# Patient Record
Sex: FEMALE | URBAN - METROPOLITAN AREA
[De-identification: names, ages, dates, MRNs, and addresses within clinical notes are randomized per-mention and may not be internally consistent; named-entity substitution may affect disease eponyms.]

---

## 2024-04-04 ENCOUNTER — APPOINTMENT (RX ONLY)
Dept: URBAN - METROPOLITAN AREA CLINIC 176 | Facility: CLINIC | Age: 29
Setting detail: DERMATOLOGY
End: 2024-04-04

## 2024-04-04 DIAGNOSIS — Z41.9 ENCOUNTER FOR PROCEDURE FOR PURPOSES OTHER THAN REMEDYING HEALTH STATE, UNSPECIFIED: ICD-10-CM

## 2024-04-04 PROCEDURE — ? HYDRAFACIAL

## 2024-04-04 ASSESSMENT — LOCATION ZONE DERM: LOCATION ZONE: FACE

## 2024-04-04 ASSESSMENT — LOCATION SIMPLE DESCRIPTION DERM
LOCATION SIMPLE: CHIN
LOCATION SIMPLE: INFERIOR FOREHEAD
LOCATION SIMPLE: RIGHT CHEEK
LOCATION SIMPLE: LEFT CHEEK

## 2024-04-04 ASSESSMENT — LOCATION DETAILED DESCRIPTION DERM
LOCATION DETAILED: RIGHT INFERIOR CENTRAL MALAR CHEEK
LOCATION DETAILED: INFERIOR MID FOREHEAD
LOCATION DETAILED: LEFT CENTRAL MALAR CHEEK
LOCATION DETAILED: RIGHT CHIN

## 2024-04-04 NOTE — PROCEDURE: HYDRAFACIAL
Procedure: Peel
Vacuum Pressure High Setting (Will Not Render If Set To 0): 22
Consent: Written consent obtained, risks reviewed including but not limited to crusting, scabbing, blistering, scarring, darker or lighter pigmentary change, bruising, and/or incomplete response.
Solution: Activ-4
Vacuum Pressure High Setting (Will Not Render If Set To 0): 16
Location: face
Procedure: Extend and Protect
Solution Override
Post-Care Instructions: I reviewed with the patient in detail post-care instructions. Patient should stay away from the sun and wear sun protection until treated areas are fully healed.
Vacuum Pressure Low Setting (Will Not Render If Set To 0): 0
Solution: GlySal 7.5%
Procedure: Extraction
Tip: Hydropeel Tip, Teal
Tip: Hydropeel Tip, Clear
Treatment Number: 1
Procedure: Fusion
Vacuum Pressure High Setting (Will Not Render If Set To 0): 14
Tip: Hydropeel Tip, Blue
Price (Use Numbers Only, No Special Characters Or $): 250.00
Indication: anti-aging
Solution: Beta-HD
Procedure: Exfoliation
Tip Override
Procedure: Boost

## 2024-05-16 ENCOUNTER — APPOINTMENT (RX ONLY)
Dept: URBAN - METROPOLITAN AREA CLINIC 176 | Facility: CLINIC | Age: 29
Setting detail: DERMATOLOGY
End: 2024-05-16

## 2024-05-16 DIAGNOSIS — L70.0 ACNE VULGARIS: ICD-10-CM

## 2024-05-16 PROCEDURE — ? HYDRAFACIAL

## 2024-05-16 ASSESSMENT — LOCATION SIMPLE DESCRIPTION DERM
LOCATION SIMPLE: LEFT CHEEK
LOCATION SIMPLE: RIGHT CHEEK
LOCATION SIMPLE: CHIN
LOCATION SIMPLE: LEFT FOREHEAD

## 2024-05-16 ASSESSMENT — LOCATION DETAILED DESCRIPTION DERM
LOCATION DETAILED: LEFT CENTRAL MALAR CHEEK
LOCATION DETAILED: RIGHT CENTRAL MALAR CHEEK
LOCATION DETAILED: RIGHT CHIN
LOCATION DETAILED: LEFT INFERIOR MEDIAL FOREHEAD

## 2024-05-16 ASSESSMENT — LOCATION ZONE DERM: LOCATION ZONE: FACE

## 2024-05-16 NOTE — PROCEDURE: HYDRAFACIAL
Location: face
Vacuum Pressure Low Setting (Will Not Render If Set To 0): 0
Tip: Hydropeel Tip, Teal
Procedure: Peel
Solution: Activ-4
Consent: Written consent obtained, risks reviewed including but not limited to crusting, scabbing, blistering, scarring, darker or lighter pigmentary change, bruising, and/or incomplete response.
Tip Override
Solution Override
Post-Care Instructions: I reviewed with the patient in detail post-care instructions. Patient should stay away from the sun and wear sun protection until treated areas are fully healed.
Vacuum Pressure High Setting (Will Not Render If Set To 0): 16
Tip: Hydropeel Tip, Clear
Procedure: Extraction
Vacuum Pressure High Setting (Will Not Render If Set To 0): 14
Solution: GlySal 7.5%
Treatment Number: 1
Procedure: Boost
Price (Use Numbers Only, No Special Characters Or $): 185.00
Tip: Hydropeel Tip, Blue
Solution: Beta-HD
Procedure: Fusion
Indication: acne
Vacuum Pressure High Setting (Will Not Render If Set To 0): 22
Procedure: Exfoliation
Procedure: Extend and Protect

## 2024-06-27 ENCOUNTER — APPOINTMENT (RX ONLY)
Dept: URBAN - METROPOLITAN AREA CLINIC 176 | Facility: CLINIC | Age: 29
Setting detail: DERMATOLOGY
End: 2024-06-27

## 2024-06-27 DIAGNOSIS — Z41.9 ENCOUNTER FOR PROCEDURE FOR PURPOSES OTHER THAN REMEDYING HEALTH STATE, UNSPECIFIED: ICD-10-CM

## 2024-06-27 PROCEDURE — ? FACIAL

## 2024-06-27 ASSESSMENT — LOCATION SIMPLE DESCRIPTION DERM: LOCATION SIMPLE: RIGHT FOREHEAD

## 2024-06-27 ASSESSMENT — LOCATION DETAILED DESCRIPTION DERM: LOCATION DETAILED: RIGHT MEDIAL FOREHEAD

## 2024-06-27 ASSESSMENT — LOCATION ZONE DERM: LOCATION ZONE: FACE

## 2024-06-27 NOTE — PROCEDURE: FACIAL
Detail Level: Zone
Exfoliation Type: enzyme peel
Comments (Non-Sticky): Patient here for HF however she has heavy little congestion throughout the face and wants to really work on this.  Is frustrated that it is not clearing.  Reviewed home care products and will add Tret . 025% and Hylgel to see if this helps her skin.  May have another facial + extarctions next month as well.  \\n\\nFacial +extractions:\\nCleansed\\nEnzymatic peel 5 mins\\nExtractions\\nSulfur mask 10 mins\\nPad\\nClarifying serum\\nAcne control\\nElta Clear tinted SPF
Price (Use Numbers Only, No Special Characters Or $): 200

## 2024-11-20 ENCOUNTER — APPOINTMENT (RX ONLY)
Dept: URBAN - METROPOLITAN AREA CLINIC 176 | Facility: CLINIC | Age: 29
Setting detail: DERMATOLOGY
End: 2024-11-20

## 2024-11-20 DIAGNOSIS — Z41.9 ENCOUNTER FOR PROCEDURE FOR PURPOSES OTHER THAN REMEDYING HEALTH STATE, UNSPECIFIED: ICD-10-CM

## 2024-11-20 PROCEDURE — ? FACIAL

## 2024-11-20 ASSESSMENT — LOCATION SIMPLE DESCRIPTION DERM: LOCATION SIMPLE: LEFT FOREHEAD

## 2024-11-20 ASSESSMENT — LOCATION DETAILED DESCRIPTION DERM: LOCATION DETAILED: LEFT MEDIAL FOREHEAD

## 2024-11-20 ASSESSMENT — LOCATION ZONE DERM: LOCATION ZONE: FACE

## 2024-11-20 NOTE — PROCEDURE: FACIAL
Price (Use Numbers Only, No Special Characters Or $): 200
Detail Level: Zone
Exfoliation Type: enzyme peel
Comments (Non-Sticky): Patient here for Facial + extractions.  Skin is still very congested on lower face.  Has only been using the pads every few days and same with the Tret.  Explained she has to try to push through the irritation with the Tret in order to see results.  Suggested she get the hydrating cream to help.  Will also give sample of Exf. Acc in place of polish to see if this helps.  \\n\\nFacial + extractions:\\nCleansed\\nEnzymatic peel\\nExtractions\\nSulfur mask\\nPad\\nClaryfing serum\\nAcne control\\nElta clear tinted SPF

## 2024-12-12 ENCOUNTER — OFFICE VISIT (OUTPATIENT)
Dept: URBAN - METROPOLITAN AREA CLINIC 43 | Facility: CLINIC | Age: 29
End: 2024-12-12
Payer: COMMERCIAL

## 2024-12-12 DIAGNOSIS — H04.123 DRY EYE SYNDROME OF BILATERAL LACRIMAL GLANDS: ICD-10-CM

## 2024-12-12 DIAGNOSIS — H52.13 MYOPIA, BILATERAL: Primary | ICD-10-CM

## 2024-12-12 PROCEDURE — 92004 COMPRE OPH EXAM NEW PT 1/>: CPT | Performed by: OPTOMETRIST

## 2024-12-12 ASSESSMENT — INTRAOCULAR PRESSURE
OS: 19
OD: 18

## 2024-12-12 ASSESSMENT — KERATOMETRY
OS: 43.75
OD: 43.38

## 2024-12-12 ASSESSMENT — VISUAL ACUITY
OS: 20/20
OD: 20/20

## 2025-01-23 ENCOUNTER — APPOINTMENT (OUTPATIENT)
Dept: URBAN - METROPOLITAN AREA CLINIC 176 | Facility: CLINIC | Age: 30
Setting detail: DERMATOLOGY
End: 2025-01-23

## 2025-01-23 DIAGNOSIS — Z41.9 ENCOUNTER FOR PROCEDURE FOR PURPOSES OTHER THAN REMEDYING HEALTH STATE, UNSPECIFIED: ICD-10-CM

## 2025-01-23 PROCEDURE — ? FACIAL

## 2025-01-23 ASSESSMENT — LOCATION ZONE DERM
LOCATION ZONE: SCALP
LOCATION ZONE: FACE

## 2025-01-23 ASSESSMENT — LOCATION DETAILED DESCRIPTION DERM
LOCATION DETAILED: LEFT CENTRAL PARIETAL SCALP
LOCATION DETAILED: INFERIOR MID FOREHEAD

## 2025-01-23 ASSESSMENT — LOCATION SIMPLE DESCRIPTION DERM
LOCATION SIMPLE: SCALP
LOCATION SIMPLE: INFERIOR FOREHEAD

## 2025-01-23 NOTE — PROCEDURE: FACIAL
Detail Level: Zone
Price (Use Numbers Only, No Special Characters Or $): 200
Comments (Non-Sticky): Patient here for facial + extractions.  Still struggling with incased backheads around cheeks and jaw.  They have improved with RetinA use but will focus more on these areas.  Over all happy with improvement.\\nPurchased renewal pads today.\\n\\nFacial +extractions:\\nCleansed\\nEnzymatic peel\\nExtractions\\nSulfur and aloe mask\\nPad\\nClaryfing serum\\nHylagel\\nElat clear tinted SPF
Extraction Method: sterile lancet
Assessment: Acne
Treatment Type (Optional): Anti Aging Facial
Exfoliation Type: enzyme peel

## 2025-02-27 ENCOUNTER — APPOINTMENT (OUTPATIENT)
Dept: URBAN - METROPOLITAN AREA CLINIC 176 | Facility: CLINIC | Age: 30
Setting detail: DERMATOLOGY
End: 2025-02-27

## 2025-02-27 DIAGNOSIS — Z41.9 ENCOUNTER FOR PROCEDURE FOR PURPOSES OTHER THAN REMEDYING HEALTH STATE, UNSPECIFIED: ICD-10-CM

## 2025-02-27 PROCEDURE — ? FACIAL

## 2025-02-27 ASSESSMENT — LOCATION SIMPLE DESCRIPTION DERM: LOCATION SIMPLE: LEFT FOREHEAD

## 2025-02-27 ASSESSMENT — LOCATION DETAILED DESCRIPTION DERM: LOCATION DETAILED: LEFT INFERIOR MEDIAL FOREHEAD

## 2025-02-27 ASSESSMENT — LOCATION ZONE DERM: LOCATION ZONE: FACE

## 2025-02-27 NOTE — PROCEDURE: FACIAL
Extraction Method: sterile lancet
Treatment Type (Optional): Anti Aging Facial
Exfoliation Type: enzyme peel
Detail Level: Zone
Price (Use Numbers Only, No Special Characters Or $): 200
Comments (Non-Sticky): Patient here for facial + extractions.  Skin has been doing well, does still have heavy congestion in tzone so recommended to use Exf. Acc if she has it.  May do a chemical peel on next treatment.  \\n\\nFacial + extractions:\\nCleansed\\nEnzymatic peel\\nExtractions\\nSulfur/aloe mask 8 mins\\nPad\\nClaryfing serum\\nBrightalive adv serum\\nElta clear tinted SPF

## 2025-03-27 ENCOUNTER — APPOINTMENT (OUTPATIENT)
Dept: URBAN - METROPOLITAN AREA CLINIC 176 | Facility: CLINIC | Age: 30
Setting detail: DERMATOLOGY
End: 2025-03-27

## 2025-03-27 DIAGNOSIS — Z41.9 ENCOUNTER FOR PROCEDURE FOR PURPOSES OTHER THAN REMEDYING HEALTH STATE, UNSPECIFIED: ICD-10-CM

## 2025-03-27 PROCEDURE — ? CHEMICAL PEEL

## 2025-03-27 ASSESSMENT — LOCATION DETAILED DESCRIPTION DERM: LOCATION DETAILED: LEFT MEDIAL FOREHEAD

## 2025-03-27 ASSESSMENT — LOCATION SIMPLE DESCRIPTION DERM: LOCATION SIMPLE: LEFT FOREHEAD

## 2025-03-27 ASSESSMENT — LOCATION ZONE DERM: LOCATION ZONE: FACE

## 2025-03-27 NOTE — PROCEDURE: CHEMICAL PEEL
Treatment Time (Optional): 6 hours
Consent: Prior to the procedure, written consent was obtained and risks were reviewed, including but not limited to: redness, peeling, blistering, pigmentary change, scarring, infection, and pain.
Detail Level: Zone
Chemical Peel: Skin Medica Vitalize
Treatment Number: 1
Post Peel Care: Given and reviewed
Post-Care Instructions: I reviewed with the patient in detail post-care instructions. Patient should avoid sun exposure and wear sun protection.
Price (Use Numbers Only, No Special Characters Or $): 150
Comments: Patient here for #1 Vitalize peel.  Still having light break outs so will try peels alternating or a series of 3.  Reviewed treatment along with home care products.  \\n\\n#1 Vitlalize peel;\\nCleansed\\nPrep\\nBarrier\\n2 layers of each peel, went half way down on neck for break outs.  Fan used during\\nElta sheer SPF applied\\nHome care given and reviewed
Number Of Layers: 2 layers of each peel vial
Prep: The treated area was degreased with pre-peel cleanser, and vaseline was applied for protection of mucous membranes.

## 2025-05-08 ENCOUNTER — APPOINTMENT (OUTPATIENT)
Dept: URBAN - METROPOLITAN AREA CLINIC 176 | Facility: CLINIC | Age: 30
Setting detail: DERMATOLOGY
End: 2025-05-08

## 2025-05-08 DIAGNOSIS — Z41.9 ENCOUNTER FOR PROCEDURE FOR PURPOSES OTHER THAN REMEDYING HEALTH STATE, UNSPECIFIED: ICD-10-CM

## 2025-05-08 PROCEDURE — ? FACIAL

## 2025-05-08 ASSESSMENT — LOCATION SIMPLE DESCRIPTION DERM: LOCATION SIMPLE: LEFT FOREHEAD

## 2025-05-08 ASSESSMENT — LOCATION DETAILED DESCRIPTION DERM: LOCATION DETAILED: LEFT MEDIAL FOREHEAD

## 2025-05-08 ASSESSMENT — LOCATION ZONE DERM: LOCATION ZONE: FACE

## 2025-05-08 NOTE — PROCEDURE: FACIAL
Price (Use Numbers Only, No Special Characters Or $): 200
Comments (Non-Sticky): Patient here for Facial + extractions.  Feel her skin has been doing well after the peel.  May alternate facial with peel.  Will restart using Exf. Acc. As well due to the heavy congestion/blackheads in tzone.  \\n\\nFacial + extractions;\\nCleansed\\nEnzymatic peel\\nExtractions\\nClaryfing serum\\nSulfur and aloe mask\\nPad\\nClaryfing serum\\nBrightalive\\nAOX DPD\\nElta clear tinted SPF
Treatment Type (Optional): Anti Aging Facial
Exfoliation Type: enzyme peel
Detail Level: Zone
Extraction Method: sterile lancet